# Patient Record
Sex: MALE | Race: WHITE | NOT HISPANIC OR LATINO | URBAN - METROPOLITAN AREA
[De-identification: names, ages, dates, MRNs, and addresses within clinical notes are randomized per-mention and may not be internally consistent; named-entity substitution may affect disease eponyms.]

---

## 2023-10-03 ENCOUNTER — TELEPHONE (OUTPATIENT)
Dept: PEDIATRIC NEUROLOGY | Facility: HOSPITAL | Age: 33
End: 2023-10-03
Payer: MEDICAID

## 2023-10-03 DIAGNOSIS — F90.2 ATTENTION DEFICIT HYPERACTIVITY DISORDER (ADHD), COMBINED TYPE: ICD-10-CM

## 2023-10-03 NOTE — TELEPHONE ENCOUNTER
Rx Refill Request Telephone Encounter    Name:  Neptali Oviedo  :  [id number]  Medication Name:  FOCALIN (DEXMETHYLPHENIDATE HCl ER 40MG ORAL CAP EXTENDED RELEASE 24 HOUR 1 PER DAY            Specific Pharmacy location:  [facility]  Date of last appointment:    Date of next appointment:  -  Best number to reach patient:  N/A

## 2023-10-04 RX ORDER — DEXMETHYLPHENIDATE HYDROCHLORIDE 40 MG/1
40 CAPSULE, EXTENDED RELEASE ORAL DAILY
COMMUNITY
Start: 2022-11-01 | End: 2023-10-04 | Stop reason: SDUPTHER

## 2023-10-04 RX ORDER — DEXMETHYLPHENIDATE HYDROCHLORIDE 40 MG/1
40 CAPSULE, EXTENDED RELEASE ORAL DAILY
Qty: 30 CAPSULE | Refills: 0 | Status: SHIPPED | OUTPATIENT
Start: 2023-10-05 | End: 2023-10-06 | Stop reason: SDUPTHER

## 2023-10-04 RX ORDER — DEXMETHYLPHENIDATE HYDROCHLORIDE 40 MG/1
40 CAPSULE, EXTENDED RELEASE ORAL DAILY
Qty: 30 CAPSULE | Refills: 0 | Status: SHIPPED | OUTPATIENT
Start: 2023-12-05 | End: 2023-10-06 | Stop reason: SDUPTHER

## 2023-10-04 RX ORDER — DEXMETHYLPHENIDATE HYDROCHLORIDE 40 MG/1
40 CAPSULE, EXTENDED RELEASE ORAL DAILY
Qty: 30 CAPSULE | Refills: 0 | Status: SHIPPED | OUTPATIENT
Start: 2023-10-04 | End: 2023-10-06 | Stop reason: SDUPTHER

## 2023-10-06 DIAGNOSIS — F90.2 ATTENTION DEFICIT HYPERACTIVITY DISORDER (ADHD), COMBINED TYPE: ICD-10-CM

## 2023-10-06 RX ORDER — DEXMETHYLPHENIDATE HYDROCHLORIDE 40 MG/1
40 CAPSULE, EXTENDED RELEASE ORAL DAILY
Qty: 30 CAPSULE | Refills: 0 | Status: SHIPPED | OUTPATIENT
Start: 2023-11-05 | End: 2023-12-27 | Stop reason: SDUPTHER

## 2023-10-06 RX ORDER — DEXMETHYLPHENIDATE HYDROCHLORIDE 40 MG/1
40 CAPSULE, EXTENDED RELEASE ORAL DAILY
Qty: 30 CAPSULE | Refills: 0 | Status: SHIPPED | OUTPATIENT
Start: 2023-10-06 | End: 2023-11-05

## 2023-10-06 RX ORDER — DEXMETHYLPHENIDATE HYDROCHLORIDE 40 MG/1
40 CAPSULE, EXTENDED RELEASE ORAL DAILY
Qty: 30 CAPSULE | Refills: 0 | Status: SHIPPED | OUTPATIENT
Start: 2023-12-06 | End: 2024-02-21 | Stop reason: SDUPTHER

## 2023-11-24 ENCOUNTER — OFFICE VISIT (OUTPATIENT)
Dept: PEDIATRIC NEUROLOGY | Facility: CLINIC | Age: 33
End: 2023-11-24
Payer: MEDICAID

## 2023-11-24 VITALS — HEIGHT: 71 IN | BODY MASS INDEX: 29.26 KG/M2 | WEIGHT: 209 LBS

## 2023-11-24 DIAGNOSIS — G40.909 NONINTRACTABLE EPILEPSY WITHOUT STATUS EPILEPTICUS, UNSPECIFIED EPILEPSY TYPE (MULTI): ICD-10-CM

## 2023-11-24 DIAGNOSIS — F90.9 ATTENTION DEFICIT HYPERACTIVITY DISORDER (ADHD), UNSPECIFIED ADHD TYPE: ICD-10-CM

## 2023-11-24 DIAGNOSIS — F41.9 ANXIETY DISORDER, UNSPECIFIED TYPE: ICD-10-CM

## 2023-11-24 DIAGNOSIS — G47.00 ORGANIC DISORDERS OF INITIATING AND MAINTAINING SLEEP: ICD-10-CM

## 2023-11-24 DIAGNOSIS — F79 INTELLECTUAL DISABILITY: ICD-10-CM

## 2023-11-24 DIAGNOSIS — R46.89 AGGRESSIVE BEHAVIOR: Primary | ICD-10-CM

## 2023-11-24 PROBLEM — F34.81 DISRUPTIVE MOOD DYSREGULATION DISORDER (MULTI): Status: ACTIVE | Noted: 2023-11-24

## 2023-11-24 PROBLEM — K02.9 CARIES: Status: ACTIVE | Noted: 2021-06-09

## 2023-11-24 PROBLEM — E78.2 HYPERLIPIDEMIA, MIXED: Status: ACTIVE | Noted: 2022-01-07

## 2023-11-24 PROCEDURE — 1036F TOBACCO NON-USER: CPT | Performed by: PSYCHIATRY & NEUROLOGY

## 2023-11-24 PROCEDURE — 99213 OFFICE O/P EST LOW 20 MIN: CPT | Performed by: PSYCHIATRY & NEUROLOGY

## 2023-11-24 RX ORDER — TOPIRAMATE 50 MG/1
TABLET, FILM COATED ORAL
COMMUNITY
Start: 2023-11-22

## 2023-11-24 RX ORDER — CHLORPROMAZINE HYDROCHLORIDE 50 MG/1
50 TABLET, FILM COATED ORAL 3 TIMES DAILY
COMMUNITY
Start: 2022-01-07 | End: 2024-04-05 | Stop reason: SDUPTHER

## 2023-11-24 RX ORDER — HYDROXYZINE HYDROCHLORIDE 25 MG/1
TABLET, FILM COATED ORAL
COMMUNITY
Start: 2019-07-31 | End: 2024-02-08

## 2023-11-24 RX ORDER — LORATADINE 10 MG/1
10 TABLET ORAL
COMMUNITY
Start: 2020-10-30

## 2023-11-24 RX ORDER — HYDROXYZINE HYDROCHLORIDE 50 MG/1
TABLET, FILM COATED ORAL
COMMUNITY
Start: 2023-11-22 | End: 2024-02-08

## 2023-11-24 RX ORDER — ZIPRASIDONE HYDROCHLORIDE 40 MG/1
CAPSULE ORAL
COMMUNITY
Start: 2023-11-22 | End: 2024-02-08

## 2023-11-24 RX ORDER — ZIPRASIDONE HYDROCHLORIDE 20 MG/1
20 CAPSULE ORAL
COMMUNITY
Start: 2022-07-08 | End: 2024-02-08

## 2023-11-24 RX ORDER — TOPIRAMATE 25 MG/1
75 TABLET ORAL 2 TIMES DAILY
COMMUNITY
Start: 2015-04-27

## 2023-11-24 RX ORDER — FENOFIBRATE 145 MG/1
145 TABLET, FILM COATED ORAL
COMMUNITY
Start: 2023-07-06

## 2023-11-24 RX ORDER — DIVALPROEX SODIUM 125 MG/1
3 CAPSULE, COATED PELLETS ORAL 3 TIMES DAILY
COMMUNITY
Start: 2016-01-22 | End: 2024-02-08

## 2023-11-24 RX ORDER — DULOXETIN HYDROCHLORIDE 60 MG/1
CAPSULE, DELAYED RELEASE ORAL
COMMUNITY
Start: 2023-11-22 | End: 2024-02-08
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
## 2023-11-24 NOTE — PATIENT INSTRUCTIONS
Neptali is seizure free with no medication side effects. Aggression and anxiety are reduced and manageable.     1. No change in his seizure medications.  2. No change in behavior medications.  3. When feasible, check seizure medication levels, lipids, CMP and CBC.  4. Follow up in 6 months.

## 2023-12-26 DIAGNOSIS — F90.2 ATTENTION DEFICIT HYPERACTIVITY DISORDER (ADHD), COMBINED TYPE: ICD-10-CM

## 2023-12-27 DIAGNOSIS — F90.2 ATTENTION DEFICIT HYPERACTIVITY DISORDER (ADHD), COMBINED TYPE: ICD-10-CM

## 2023-12-27 RX ORDER — DEXMETHYLPHENIDATE HYDROCHLORIDE 40 MG/1
40 CAPSULE, EXTENDED RELEASE ORAL DAILY
Qty: 30 CAPSULE | Refills: 0 | Status: SHIPPED | OUTPATIENT
Start: 2024-02-27 | End: 2024-03-28

## 2023-12-27 RX ORDER — DEXMETHYLPHENIDATE HYDROCHLORIDE 40 MG/1
40 CAPSULE, EXTENDED RELEASE ORAL DAILY
Qty: 30 CAPSULE | Refills: 0 | Status: SHIPPED | OUTPATIENT
Start: 2024-01-27 | End: 2024-02-26

## 2023-12-27 RX ORDER — DEXMETHYLPHENIDATE HYDROCHLORIDE 40 MG/1
40 CAPSULE, EXTENDED RELEASE ORAL DAILY
Qty: 30 CAPSULE | Refills: 0 | Status: SHIPPED | OUTPATIENT
Start: 2023-12-27

## 2024-01-12 RX ORDER — DEXMETHYLPHENIDATE HYDROCHLORIDE 40 MG/1
CAPSULE, EXTENDED RELEASE ORAL
Qty: 30 CAPSULE | Refills: 0 | OUTPATIENT
Start: 2024-01-12

## 2024-02-07 DIAGNOSIS — R46.89 AGGRESSIVE BEHAVIOR: ICD-10-CM

## 2024-02-07 DIAGNOSIS — G40.909 NONINTRACTABLE EPILEPSY WITHOUT STATUS EPILEPTICUS, UNSPECIFIED EPILEPSY TYPE (MULTI): Primary | ICD-10-CM

## 2024-02-07 DIAGNOSIS — F41.9 ANXIETY DISORDER, UNSPECIFIED TYPE: ICD-10-CM

## 2024-02-08 RX ORDER — ZIPRASIDONE HYDROCHLORIDE 40 MG/1
CAPSULE ORAL
Qty: 62 CAPSULE | Refills: 10 | Status: SHIPPED | OUTPATIENT
Start: 2024-02-08

## 2024-02-08 RX ORDER — DIVALPROEX SODIUM 125 MG/1
CAPSULE, COATED PELLETS ORAL
Qty: 279 CAPSULE | Refills: 10 | Status: SHIPPED | OUTPATIENT
Start: 2024-02-08

## 2024-02-08 RX ORDER — DULOXETIN HYDROCHLORIDE 20 MG/1
CAPSULE, DELAYED RELEASE ORAL
Qty: 31 CAPSULE | Refills: 10 | Status: SHIPPED | OUTPATIENT
Start: 2024-02-08

## 2024-02-08 RX ORDER — ZIPRASIDONE HYDROCHLORIDE 20 MG/1
CAPSULE ORAL
Qty: 31 CAPSULE | Refills: 10 | Status: SHIPPED | OUTPATIENT
Start: 2024-02-08

## 2024-02-08 RX ORDER — HYDROXYZINE HYDROCHLORIDE 50 MG/1
TABLET, FILM COATED ORAL
Qty: 31 TABLET | Refills: 10 | Status: SHIPPED | OUTPATIENT
Start: 2024-02-08 | End: 2024-03-25 | Stop reason: SDUPTHER

## 2024-02-08 RX ORDER — HYDROXYZINE HYDROCHLORIDE 25 MG/1
TABLET, FILM COATED ORAL
Qty: 31 TABLET | Refills: 10 | Status: SHIPPED | OUTPATIENT
Start: 2024-02-08 | End: 2024-03-25 | Stop reason: DRUGHIGH

## 2024-02-08 RX ORDER — DULOXETIN HYDROCHLORIDE 60 MG/1
CAPSULE, DELAYED RELEASE ORAL
Qty: 31 CAPSULE | Refills: 10 | Status: SHIPPED | OUTPATIENT
Start: 2024-02-08

## 2024-02-13 ENCOUNTER — TELEPHONE (OUTPATIENT)
Dept: PEDIATRIC NEUROLOGY | Facility: HOSPITAL | Age: 34
End: 2024-02-13
Payer: MEDICAID

## 2024-02-21 DIAGNOSIS — F90.2 ATTENTION DEFICIT HYPERACTIVITY DISORDER (ADHD), COMBINED TYPE: ICD-10-CM

## 2024-02-21 RX ORDER — DEXMETHYLPHENIDATE HYDROCHLORIDE 40 MG/1
40 CAPSULE, EXTENDED RELEASE ORAL DAILY
Qty: 30 CAPSULE | Refills: 0 | Status: SHIPPED | OUTPATIENT
Start: 2024-02-21 | End: 2024-04-11 | Stop reason: SDUPTHER

## 2024-02-21 RX ORDER — DEXMETHYLPHENIDATE HYDROCHLORIDE 40 MG/1
40 CAPSULE, EXTENDED RELEASE ORAL DAILY
Qty: 30 CAPSULE | Refills: 0 | Status: SHIPPED | OUTPATIENT
Start: 2024-03-22 | End: 2024-04-21

## 2024-03-22 ENCOUNTER — TELEPHONE (OUTPATIENT)
Dept: PEDIATRIC NEUROLOGY | Facility: HOSPITAL | Age: 34
End: 2024-03-22
Payer: MEDICAID

## 2024-03-22 NOTE — TELEPHONE ENCOUNTER
Called and spoke with mom. Neptali has been doing pretty good overall. Has a new provider in his house and at the day program. Everyone is observing an increase in anxiousness and agitation after about 4pm. The only medication Neptali gets around that time is his hydroxyzine 25mg dose. They are requesting an increase in this dose to help evening time behaviors. Neptali's mood seems down in the evenings, is more on edge after this time and he is having a hard time settling. He sits down to eat dinner but only takes a few bites and gets back up. This lasts until he goes to bed. Mom thinks a contributing factor also is that there is more free time in the evenings and less structure, as Neptali prefers structure.    Neptali has a bedtime routine that starts around 9:30pm and he is in bed by 10pm. He does ask to go to bed sometimes and seems tired but often just lays there until about 12:30am. This has been going on 6-8 months per mom, so it is not new, but is an ongoing issue.    Mom noting that they have seen lots of positive changes with the new staff. Neptali was struggling with regressive behaviors, such as bed wetting, are now completely gone. He has been able to do more social things now, like going grocery shopping.     If any med changes are made, pharmacy was changed to [facility]..

## 2024-03-22 NOTE — TELEPHONE ENCOUNTER
95kg  Focalin ER 40mg daily  Duloxetine 80mg daily  Hydroxyzine 50mgAM, 25mg HS  Ziprasidone 40mg/40mg/20mg  Thorazine 50mg BID  Depakote sprinkles (3 caps)375mg TID  Topiramate 75mg BID

## 2024-03-25 DIAGNOSIS — F41.9 ANXIETY DISORDER, UNSPECIFIED TYPE: ICD-10-CM

## 2024-03-25 RX ORDER — HYDROXYZINE HYDROCHLORIDE 50 MG/1
TABLET, FILM COATED ORAL
Qty: 31 TABLET | Refills: 1 | Status: SHIPPED | OUTPATIENT
Start: 2024-03-25 | End: 2024-04-01 | Stop reason: SDUPTHER

## 2024-03-25 NOTE — TELEPHONE ENCOUNTER
"Called and spoke with mom. Discussed with her that Dr Grant is okay with trialing an increase in Neptali's 4pm hydroxyzine to 50mg. They are to trial this increase and mom is to call with an update in about 3 weeks. Mom states a new script needs to be sent for this to [facility] on file. She asks that it states instead of \"50mg twice daily\" that it specifies morning and 4pm on the script. Mom verbalized understanding of the plan and states no further questions at this time.  "

## 2024-04-01 ENCOUNTER — TELEPHONE (OUTPATIENT)
Dept: PEDIATRIC NEUROLOGY | Facility: HOSPITAL | Age: 34
End: 2024-04-01
Payer: MEDICAID

## 2024-04-01 DIAGNOSIS — F41.9 ANXIETY DISORDER, UNSPECIFIED TYPE: ICD-10-CM

## 2024-04-01 RX ORDER — HYDROXYZINE HYDROCHLORIDE 50 MG/1
TABLET, FILM COATED ORAL
Qty: 62 TABLET | Refills: 5 | Status: SHIPPED | OUTPATIENT
Start: 2024-04-01

## 2024-04-05 DIAGNOSIS — R46.89 AGGRESSIVE BEHAVIOR: ICD-10-CM

## 2024-04-05 RX ORDER — CHLORPROMAZINE HYDROCHLORIDE 50 MG/1
50 TABLET, FILM COATED ORAL 2 TIMES DAILY
Qty: 60 TABLET | Refills: 3 | Status: SHIPPED | OUTPATIENT
Start: 2024-04-05 | End: 2024-08-03

## 2024-04-11 DIAGNOSIS — F90.2 ATTENTION DEFICIT HYPERACTIVITY DISORDER (ADHD), COMBINED TYPE: ICD-10-CM

## 2024-04-11 RX ORDER — DEXMETHYLPHENIDATE HYDROCHLORIDE 40 MG/1
40 CAPSULE, EXTENDED RELEASE ORAL DAILY
Qty: 30 CAPSULE | Refills: 0 | Status: SHIPPED | OUTPATIENT
Start: 2024-04-11 | End: 2024-05-11

## 2024-04-11 RX ORDER — DEXMETHYLPHENIDATE HYDROCHLORIDE 40 MG/1
40 CAPSULE, EXTENDED RELEASE ORAL DAILY
Qty: 30 CAPSULE | Refills: 0 | Status: SHIPPED | OUTPATIENT
Start: 2024-06-10 | End: 2024-07-10

## 2024-04-11 RX ORDER — DEXMETHYLPHENIDATE HYDROCHLORIDE 40 MG/1
40 CAPSULE, EXTENDED RELEASE ORAL DAILY
Qty: 30 CAPSULE | Refills: 0 | Status: SHIPPED | OUTPATIENT
Start: 2024-05-11 | End: 2024-06-10

## 2024-04-11 RX ORDER — DEXMETHYLPHENIDATE HYDROCHLORIDE 40 MG/1
CAPSULE, EXTENDED RELEASE ORAL
Qty: 28 CAPSULE | OUTPATIENT
Start: 2024-04-11

## 2024-04-17 ENCOUNTER — TELEPHONE (OUTPATIENT)
Dept: PEDIATRIC NEUROLOGY | Facility: HOSPITAL | Age: 34
End: 2024-04-17
Payer: MEDICAID

## 2024-04-17 NOTE — TELEPHONE ENCOUNTER
Neptali has 2 RNs working with him at home now. They were offering to take him to get his blood work done, the PCP has some labs they want drawn. Mom was wondering if they could get a Depakote level checked as well as any other labs you feel necessary.  Hasn't had labs in what look s like 3 years.      Mom also states with the hydroxyzine there has been not much of a change, but no negative change so she wants to leave the dose where it is now. She wants levels firs then we can go from there.  They have appt with dr. Grant in 5 weeks.

## 2024-04-22 ENCOUNTER — DOCUMENTATION (OUTPATIENT)
Dept: PEDIATRIC NEUROLOGY | Facility: CLINIC | Age: 34
End: 2024-04-22
Payer: MEDICAID

## 2024-04-22 DIAGNOSIS — G40.909 NONINTRACTABLE EPILEPSY WITHOUT STATUS EPILEPTICUS, UNSPECIFIED EPILEPSY TYPE (MULTI): ICD-10-CM

## 2024-04-22 DIAGNOSIS — R46.89 AGGRESSIVE BEHAVIOR: ICD-10-CM

## 2024-04-22 NOTE — PROGRESS NOTES
Denial received for dexmethylphenidate 40mg ER cap. 1 cap daily  Verbal PA submitted for dexmethyl ER 40mg caps Ref: [id number]

## 2024-04-30 ENCOUNTER — TELEPHONE (OUTPATIENT)
Dept: PEDIATRIC NEUROLOGY | Facility: CLINIC | Age: 34
End: 2024-04-30
Payer: MEDICAID

## 2024-04-30 DIAGNOSIS — R46.89 AGGRESSIVE BEHAVIOR: ICD-10-CM

## 2024-04-30 NOTE — TELEPHONE ENCOUNTER
Mom also requesting note to give depakote whole in applesauce, yogurt or pudding.    Letter sent to [facility] to place in dr. fernandes's box to sign
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
## 2024-05-03 DIAGNOSIS — R46.89 AGGRESSIVE BEHAVIOR: ICD-10-CM

## 2024-05-03 RX ORDER — CHLORPROMAZINE HYDROCHLORIDE 25 MG/1
25 TABLET, FILM COATED ORAL 2 TIMES DAILY
Qty: 60 TABLET | Refills: 0 | Status: SHIPPED | OUTPATIENT
Start: 2024-05-03 | End: 2024-05-07 | Stop reason: SDUPTHER

## 2024-05-03 RX ORDER — CHLORPROMAZINE HYDROCHLORIDE 25 MG/1
25 TABLET, FILM COATED ORAL 2 TIMES DAILY
Qty: 60 TABLET | Refills: 0 | Status: CANCELLED | OUTPATIENT
Start: 2024-05-03 | End: 2024-06-02

## 2024-05-07 ENCOUNTER — LAB (OUTPATIENT)
Dept: LAB | Facility: LAB | Age: 34
End: 2024-05-07
Payer: COMMERCIAL

## 2024-05-07 DIAGNOSIS — R46.89 AGGRESSIVE BEHAVIOR: ICD-10-CM

## 2024-05-07 DIAGNOSIS — G40.909 NONINTRACTABLE EPILEPSY WITHOUT STATUS EPILEPTICUS, UNSPECIFIED EPILEPSY TYPE (MULTI): ICD-10-CM

## 2024-05-07 RX ORDER — CHLORPROMAZINE HYDROCHLORIDE 25 MG/1
25 TABLET, FILM COATED ORAL 2 TIMES DAILY
Qty: 60 TABLET | Refills: 0 | Status: SHIPPED | OUTPATIENT
Start: 2024-05-07 | End: 2024-06-06

## 2024-05-08 ENCOUNTER — APPOINTMENT (OUTPATIENT)
Dept: PEDIATRIC NEUROLOGY | Facility: CLINIC | Age: 34
End: 2024-05-08
Payer: COMMERCIAL

## 2024-05-24 ENCOUNTER — OFFICE VISIT (OUTPATIENT)
Dept: PEDIATRIC NEUROLOGY | Facility: CLINIC | Age: 34
End: 2024-05-24
Payer: COMMERCIAL

## 2024-05-24 VITALS
HEIGHT: 72 IN | SYSTOLIC BLOOD PRESSURE: 128 MMHG | BODY MASS INDEX: 28.37 KG/M2 | WEIGHT: 209.44 LBS | HEART RATE: 95 BPM | DIASTOLIC BLOOD PRESSURE: 85 MMHG

## 2024-05-24 DIAGNOSIS — F90.9 ATTENTION DEFICIT HYPERACTIVITY DISORDER (ADHD), UNSPECIFIED ADHD TYPE: ICD-10-CM

## 2024-05-24 DIAGNOSIS — F90.9 ATTENTION DEFICIT HYPERACTIVITY DISORDER (ADHD), UNSPECIFIED ADHD TYPE: Primary | ICD-10-CM

## 2024-05-24 DIAGNOSIS — R46.89 AGGRESSIVE BEHAVIOR: ICD-10-CM

## 2024-05-24 DIAGNOSIS — F41.9 ANXIETY DISORDER, UNSPECIFIED TYPE: ICD-10-CM

## 2024-05-24 PROCEDURE — 99214 OFFICE O/P EST MOD 30 MIN: CPT | Performed by: PSYCHIATRY & NEUROLOGY

## 2024-05-24 PROCEDURE — 1036F TOBACCO NON-USER: CPT | Performed by: PSYCHIATRY & NEUROLOGY

## 2024-05-24 RX ORDER — LORAZEPAM 1 MG/1
1 TABLET ORAL AS NEEDED
Qty: 5 TABLET | Refills: 0 | Status: SHIPPED | OUTPATIENT
Start: 2024-05-24

## 2024-05-24 RX ORDER — DEXMETHYLPHENIDATE HYDROCHLORIDE 10 MG/1
TABLET ORAL
Qty: 35 TABLET | Refills: 0 | Status: SHIPPED | OUTPATIENT
Start: 2024-05-24 | End: 2024-05-31 | Stop reason: SDUPTHER

## 2024-05-24 RX ORDER — DEXMETHYLPHENIDATE HYDROCHLORIDE 10 MG/1
TABLET ORAL
Qty: 35 TABLET | Refills: 0 | Status: SHIPPED | OUTPATIENT
Start: 2024-05-24 | End: 2024-05-24 | Stop reason: SDUPTHER
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
## 2024-05-24 NOTE — PATIENT INSTRUCTIONS
Neptali has upsets with transitions.  In the afternoon, he is more active and fidgety.  Anxiety and OCD may be reduced with his medication increases but the upsets with transitions continue.  Since there is a behavioral difference between morning and afternoon, the reason may be ADHD based.  Therefore we are doing the following:     Change dexmethylphenidate to the tablets at a dose of 20 mg in the morning and 11-12 and 10 mg at 3-4 PM.  See how this affects upsets and impulsivity.  No change in other medications at this time.  If needed, may further increase duloxetine.  Call next week about the dexmethylphenidate effect.  Lorazepam 1 mg tab to keep him calm before oral surgery.  Try 1 tablet before to note effect.  If not enough, then give 2 tabs on day of surgery.  Follow up in 3 months.
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
## 2024-05-31 ENCOUNTER — TELEPHONE (OUTPATIENT)
Dept: PEDIATRIC NEUROLOGY | Facility: CLINIC | Age: 34
End: 2024-05-31
Payer: COMMERCIAL

## 2024-05-31 RX ORDER — DEXMETHYLPHENIDATE HYDROCHLORIDE 10 MG/1
TABLET ORAL
Qty: 150 TABLET | Refills: 0 | Status: SHIPPED | OUTPATIENT
Start: 2024-05-31

## 2024-05-31 NOTE — TELEPHONE ENCOUNTER
Per 5/24 visit note, changed Focalin XR 40mg to Focalin IR dosing at 20mg AM, 20mg noon, 10mg 4pm. See how this affects upsets and impulsivity.

## 2024-05-31 NOTE — TELEPHONE ENCOUNTER
Mom calling to state Dr Grant sent a 7 day script for a new med for Neptali at the last appointment and they would like to continue this for now. Mom is requesting a 30 day script be sent to [facility] in [place].    [phone]

## 2024-05-31 NOTE — TELEPHONE ENCOUNTER
Called and spoke with mom. Things seem to be going a little better with Neptali on the IR dosing of Focalin. The transition home was smoother, not as aggressive. Mom would like to continue this dosing over the next 30 days and then will report back again. Script is to go to [facility] pharmacy on file. Mom with no further questions at this time.

## 2024-06-17 DIAGNOSIS — F90.9 ATTENTION DEFICIT HYPERACTIVITY DISORDER (ADHD), UNSPECIFIED ADHD TYPE: ICD-10-CM

## 2024-06-17 RX ORDER — DEXMETHYLPHENIDATE HYDROCHLORIDE 10 MG/1
TABLET ORAL
Qty: 150 TABLET | Refills: 0 | Status: SHIPPED | OUTPATIENT
Start: 2024-06-26

## 2024-06-21 ENCOUNTER — TELEPHONE (OUTPATIENT)
Dept: PEDIATRIC NEUROLOGY | Facility: CLINIC | Age: 34
End: 2024-06-21
Payer: COMMERCIAL

## 2024-06-21 DIAGNOSIS — R46.89 AGGRESSIVE BEHAVIOR: ICD-10-CM

## 2024-06-21 RX ORDER — CHLORPROMAZINE HYDROCHLORIDE 25 MG/1
25 TABLET, FILM COATED ORAL 2 TIMES DAILY
Qty: 60 TABLET | Refills: 5 | Status: SHIPPED | OUTPATIENT
Start: 2024-06-21 | End: 2024-12-18

## 2024-06-21 RX ORDER — CHLORPROMAZINE HYDROCHLORIDE 50 MG/1
50 TABLET, FILM COATED ORAL 2 TIMES DAILY
Qty: 60 TABLET | Refills: 5 | Status: SHIPPED | OUTPATIENT
Start: 2024-06-21 | End: 2024-12-18

## 2024-06-21 NOTE — TELEPHONE ENCOUNTER
Received call from [facility] pharmacy with questions about chlorpromazine dosing/scripts.    [phone]

## 2024-06-21 NOTE — TELEPHONE ENCOUNTER
Called and spoke with [facility] pharmacist regarding scripts. Questions on chlorpromazine and focalin. Verbal order given for chlorpromazine 25mg and 50mg tablets (1 month supply) due to them being completely out of medication and they are not open over the weekend. RN will also send further refills on these scripts for Dr Grant's signature. RN also clarifying that Neptali is no longer on Focalin XR and is currently on the Focalin IR tablets. Pharmacist removing all Focalin XR scripts from their system to reduce any further confusion on this. Pharmacist with no further questions at this time.

## 2024-06-24 DIAGNOSIS — R46.89 AGGRESSIVE BEHAVIOR: ICD-10-CM

## 2024-06-24 DIAGNOSIS — G40.909 NONINTRACTABLE EPILEPSY WITHOUT STATUS EPILEPTICUS, UNSPECIFIED EPILEPSY TYPE (MULTI): ICD-10-CM

## 2024-06-24 RX ORDER — DIVALPROEX SODIUM 125 MG/1
CAPSULE, COATED PELLETS ORAL
Qty: 279 CAPSULE | Refills: 10 | Status: SHIPPED | OUTPATIENT
Start: 2024-06-24

## 2024-06-24 RX ORDER — ZIPRASIDONE HYDROCHLORIDE 40 MG/1
CAPSULE ORAL
Qty: 62 CAPSULE | Refills: 10 | Status: SHIPPED | OUTPATIENT
Start: 2024-06-24

## 2024-06-24 RX ORDER — ZIPRASIDONE HYDROCHLORIDE 20 MG/1
CAPSULE ORAL
Qty: 31 CAPSULE | Refills: 10 | Status: SHIPPED | OUTPATIENT
Start: 2024-06-24

## 2024-06-26 ENCOUNTER — TELEPHONE (OUTPATIENT)
Dept: PEDIATRIC NEUROLOGY | Facility: CLINIC | Age: 34
End: 2024-06-26
Payer: COMMERCIAL

## 2024-06-26 NOTE — TELEPHONE ENCOUNTER
Called and spoke with [facility] representative. Medication was denied on 6/10 but sent to incorrect fax number. Denied due to quantity limit. Plan pays for 2 tablets/day, script is for 5 tablets/day.     Representative sending original denial letter to fax RN provided as well as an appeals form to be completed.

## 2024-06-26 NOTE — TELEPHONE ENCOUNTER
Received letter stating a PA for dexmethylphenidate 10mg tablets was canceled due to a denial already on file for the medication.     No denial scanned to patient's chart.

## 2024-06-27 NOTE — TELEPHONE ENCOUNTER
Mom called for update as she will run out of medication after Saturday and she is not sure what the hold up in getting medication is.   Called and left VM as mom did not answer and not an identified VM.

## 2024-06-28 NOTE — TELEPHONE ENCOUNTER
Called and spoke with mom. Reviewed with her that insurance has denied coverage for Neptali's Focalin 10mg tablets at this time. An appeal has been submitted but we are waiting on the response from insurance. Mom states she has already paid for a 30 day script with [employer] in the meantime so Neptali did not have to go without medication. Mom states if she has not heard back on whether or not the appeal was overturned, she will call back in 1-2 weeks. Let her know, if Focalin tablets are not approved, Dr Grant did state we can trial Neptali on Azstarys as a back-up plan. Mom verbalized understanding and states no further questions at this time.

## 2024-06-28 NOTE — TELEPHONE ENCOUNTER
Received fax from [employer] in regards to the appeal they received for Focalin 10mg tablets, requesting additional information by end of business today. RN answered all questions and faxed back to [phone] as requested.    Received confirmed success on fax sent.

## 2024-06-28 NOTE — TELEPHONE ENCOUNTER
Called and spoke with [employer] again as no fax had been received with denial letter/appeal form. RN able to submit verbal appeal for dexmethylphenidate 10mg tablets.    Reference #: [id number]    Representative requested RN fax office note/supporting documentation via fax to attach to verbal appeal. Fax: [phone]

## 2024-06-28 NOTE — TELEPHONE ENCOUNTER
Still have not received denial letter or appeal form from [employer]. Called and spoke with representative again. They will send it again within the next hour. Provided them with alternate fax number to be sent to this morning.

## 2024-06-28 NOTE — TELEPHONE ENCOUNTER
Received original denial letter. Sent to  Amelie to scan to patient's chart.    Received success confirmation on visit note faxed to [employer].

## 2024-07-02 DIAGNOSIS — F90.9 ATTENTION DEFICIT HYPERACTIVITY DISORDER (ADHD), UNSPECIFIED ADHD TYPE: ICD-10-CM

## 2024-07-02 RX ORDER — DEXMETHYLPHENIDATE HYDROCHLORIDE 10 MG/1
TABLET ORAL
Qty: 105 TABLET | Refills: 0 | Status: SHIPPED | OUTPATIENT
Start: 2024-08-30

## 2024-07-02 RX ORDER — DEXMETHYLPHENIDATE HYDROCHLORIDE 10 MG/1
TABLET ORAL
Qty: 105 TABLET | Refills: 0 | Status: SHIPPED | OUTPATIENT
Start: 2024-07-02

## 2024-07-02 RX ORDER — DEXMETHYLPHENIDATE HYDROCHLORIDE 10 MG/1
TABLET ORAL
Qty: 105 TABLET | Refills: 0 | Status: SHIPPED | OUTPATIENT
Start: 2024-08-01

## 2024-07-25 DIAGNOSIS — F90.9 ATTENTION DEFICIT HYPERACTIVITY DISORDER (ADHD), UNSPECIFIED ADHD TYPE: ICD-10-CM

## 2024-07-25 RX ORDER — DEXMETHYLPHENIDATE HYDROCHLORIDE 10 MG/1
TABLET ORAL
Qty: 150 TABLET | Refills: 0 | Status: SHIPPED | OUTPATIENT
Start: 2024-08-01 | End: 2024-08-01 | Stop reason: SDUPTHER

## 2024-07-31 DIAGNOSIS — F90.9 ATTENTION DEFICIT HYPERACTIVITY DISORDER (ADHD), UNSPECIFIED ADHD TYPE: ICD-10-CM

## 2024-07-31 RX ORDER — TOPIRAMATE 50 MG/1
50 TABLET, FILM COATED ORAL 2 TIMES DAILY
Qty: 180 TABLET | Refills: 1 | Status: SHIPPED | OUTPATIENT
Start: 2024-07-31 | End: 2025-01-27

## 2024-07-31 RX ORDER — TOPIRAMATE 25 MG/1
25 TABLET ORAL 2 TIMES DAILY
Qty: 180 TABLET | Refills: 1 | Status: SHIPPED | OUTPATIENT
Start: 2024-07-31 | End: 2025-01-27

## 2024-07-31 RX ORDER — FENOFIBRATE 145 MG/1
145 TABLET, FILM COATED ORAL
Qty: 90 TABLET | Refills: 1 | Status: SHIPPED | OUTPATIENT
Start: 2024-07-31 | End: 2025-01-27

## 2024-08-01 ENCOUNTER — APPOINTMENT (OUTPATIENT)
Dept: PEDIATRIC NEUROLOGY | Facility: CLINIC | Age: 34
End: 2024-08-01
Payer: COMMERCIAL

## 2024-08-01 VITALS — WEIGHT: 198.41 LBS | HEIGHT: 70 IN | BODY MASS INDEX: 28.41 KG/M2

## 2024-08-01 DIAGNOSIS — G40.909 NONINTRACTABLE EPILEPSY WITHOUT STATUS EPILEPTICUS, UNSPECIFIED EPILEPSY TYPE (MULTI): ICD-10-CM

## 2024-08-01 DIAGNOSIS — R46.89 AGGRESSIVE BEHAVIOR: ICD-10-CM

## 2024-08-01 DIAGNOSIS — F79 INTELLECTUAL DISABILITY: ICD-10-CM

## 2024-08-01 DIAGNOSIS — F90.9 ATTENTION DEFICIT HYPERACTIVITY DISORDER (ADHD), UNSPECIFIED ADHD TYPE: ICD-10-CM

## 2024-08-01 DIAGNOSIS — F41.9 ANXIETY DISORDER, UNSPECIFIED TYPE: Primary | ICD-10-CM

## 2024-08-01 DIAGNOSIS — G47.00 ORGANIC DISORDERS OF INITIATING AND MAINTAINING SLEEP: ICD-10-CM

## 2024-08-01 PROCEDURE — 99213 OFFICE O/P EST LOW 20 MIN: CPT | Performed by: PSYCHIATRY & NEUROLOGY

## 2024-08-01 PROCEDURE — 3008F BODY MASS INDEX DOCD: CPT | Performed by: PSYCHIATRY & NEUROLOGY

## 2024-08-01 RX ORDER — DEXMETHYLPHENIDATE HYDROCHLORIDE 10 MG/1
TABLET ORAL
Qty: 150 TABLET | Refills: 0 | Status: SHIPPED | OUTPATIENT
Start: 2024-08-23

## 2024-08-01 RX ORDER — DEXMETHYLPHENIDATE HYDROCHLORIDE 10 MG/1
TABLET ORAL
Qty: 150 TABLET | Refills: 0 | Status: SHIPPED | OUTPATIENT
Start: 2024-10-21

## 2024-08-01 RX ORDER — DEXMETHYLPHENIDATE HYDROCHLORIDE 10 MG/1
TABLET ORAL
Qty: 150 TABLET | Refills: 0 | Status: SHIPPED | OUTPATIENT
Start: 2024-09-22

## 2024-08-01 NOTE — PATIENT INSTRUCTIONS
Neptali's upsets are reduced.  He has enuresis and encopresis that is location and, to some degree, time specific and not associated with any change in his neurologic exam, leading to the conclusion that it is behavioral in nature.  He goes to bed at 8 PM but does nto fall asleep till after 10 PM.    No change in medications.  Develop a plan to encourage him to use the bathroom at a fixed schedule so as to minimize any accidents.  Shift bedtime to 10 PM, which is more appropriate for someone his age.  If not enough, might change ziprasidone to a 40-20-40 mg schedule.  Follow up in 3 months.

## 2024-08-01 NOTE — LETTER
August 1, 2024     Mona Ingram DO  [address] [facility] [address]    Patient: Neptali Oviedo   YOB: 1990   Date of Visit: 8/1/2024       Dear Dr. Mona Ingram DO:    Thank you for referring Neptali Oviedo to me for evaluation. Below are my notes for this consultation.  If you have questions, please do not hesitate to call me. I look forward to following your patient along with you.       Sincerely,     Steve Grant MD      CC: No Recipients  ______________________________________________________________________________________    Subjective  Neptali Oviedo is a 34 y.o.   man with epilepsy    HPI    Neptali is a 33-year-old young man with developmental problems, epilepsy and challenging behaviors. He takes topiramate 75 mg twice a day and Depakote sprinkles 3 tid with no seizures or medication side effects.     Neptali takes dexmethylphenidate 20-20-10 mg (less active), duloxetine 80 mg qd, hydroxyzine 50 mg qAM and 25 mg qHS, ziprasidone 40-40-20 mg, and Thorazine 75 mg bid (tolerated dose decrease from 650 mg daily with some worsening on this dose). Methylphenidate and Abilify were stopped. A fluoxetine trial resulted in worse behavior so was stopped. Abilify reduced aggression. Increasing venlafaxine led to a call from his residence that he was more aggressive so the dose was decreased to 75 mg daily.. Venlafaxine was replaced with duloxetine, the present dose being 80 mg daily. This medication reduced the number of upsets to a few times weekly.  A further increase to 80 mg daily and of Thorazine to 75 mg bid had no effect on the upsets that occur with transitions, which include property destruction.  Upsets improved with increasing dexmethylphenidate to 20-20-10 mg daily using the IR formulation.     He attends a day program through [facility] 5 days weekly with a 1/2 day schedule. He has one on one supervision. He left previous program due to physical aggression towards staff and  inappropriate actions such as pulling down his pants or running out of the building that have led to a 30 notice of discharge. He goes to sports activities with his home aide.     Other behaviors include punching out neighbor's windows, attacking strangers and parents, punching father's truck, agitation in mother's car, throw items, yell. He asks that doors and cabinets be closed, appliances must be put away. He is bothered by the music in stores but not from the radio in the car or at home, crowds. He goes on trips but usually does not enter the locations.  This is reduced, perhaps due to increasing duloxetine.     Neptali was admitted to [facility] psychiatric unit in 2020 due to aggression. Methylphenidate, propranolol, and Abilify were stopped and chlorpromazine and venlafaxine were added for aggression and anxiety. He retuned to his home. Appetite is increased.     Past medications include methylphenidate, propranolol, Abilify, clonidine, risperidone, quetiapine, fluoxetine (worse).      ROS: Neptali developed nocturnal enuresis in February 2024 with a normal abdominal CT scan.  This reduced in frequency with a staff change.  It increased in conjunction with daytime enuresis and encopresis in June 2024 at his residential placement.  This rarely occurs at his day program or parent's home.  Reviewing a log finds it happens less often on weekends and usually happens in the evening and overnight.    Objective  Neurological Exam  Mental Status  Awake and alert.  Stands near me and says he wants to go  No aggression.    Motor   No abnormal involuntary movements.    Physical Exam  Constitutional:       General: He is awake.   Pulmonary:      Effort: Pulmonary effort is normal.   Abdominal:      Palpations: Abdomen is soft.   Neurological:      Mental Status: He is alert.       Assessment/Plan    Neptali's upsets are reduced.  He has enuresis and encopresis that is location and, to some degree, time specific and not associated  with any change in his neurologic exam, leading to the conclusion that it is behavioral in nature.  He goes to bed at 8 PM but does nto fall asleep till after 10 PM.    No change in medications.  Develop a plan to encourage him to use the bathroom at a fixed schedule so as to minimize any accidents.  Shift bedtime to 10 PM, which is more appropriate for someone his age.  If not enough, might change ziprasidone to a 40-20-40 mg schedule.  Follow up in 3 months.

## 2024-08-01 NOTE — PROGRESS NOTES
Subjective   Neptali Oviedo is a 34 y.o.   man with epilepsy    HPI    Neptali is a 33-year-old young man with developmental problems, epilepsy and challenging behaviors. He takes topiramate 75 mg twice a day and Depakote sprinkles 3 tid with no seizures or medication side effects.     Neptali takes dexmethylphenidate 20-20-10 mg (less active), duloxetine 80 mg qd, hydroxyzine 50 mg qAM and 25 mg qHS, ziprasidone 40-40-20 mg, and Thorazine 75 mg bid (tolerated dose decrease from 650 mg daily with some worsening on this dose). Methylphenidate and Abilify were stopped. A fluoxetine trial resulted in worse behavior so was stopped. Abilify reduced aggression. Increasing venlafaxine led to a call from his residence that he was more aggressive so the dose was decreased to 75 mg daily.. Venlafaxine was replaced with duloxetine, the present dose being 80 mg daily. This medication reduced the number of upsets to a few times weekly.  A further increase to 80 mg daily and of Thorazine to 75 mg bid had no effect on the upsets that occur with transitions, which include property destruction.  Upsets improved with increasing dexmethylphenidate to 20-20-10 mg daily using the IR formulation.     He attends a day program through [facility] 5 days weekly with a 1/2 day schedule. He has one on one supervision. He left previous program due to physical aggression towards staff and inappropriate actions such as pulling down his pants or running out of the building that have led to a 30 notice of discharge. He goes to sports activities with his home aide.     Other behaviors include punching out neighbor's windows, attacking strangers and parents, punching father's truck, agitation in mother's car, throw items, yell. He asks that doors and cabinets be closed, appliances must be put away. He is bothered by the music in stores but not from the radio in the car or at home, crowds. He goes on trips but usually does not enter the locations.  This is  reduced, perhaps due to increasing duloxetine.     Neptali was admitted to [facility] psychiatric unit in 2020 due to aggression. Methylphenidate, propranolol, and Abilify were stopped and chlorpromazine and venlafaxine were added for aggression and anxiety. He retuned to his home. Appetite is increased.     Past medications include methylphenidate, propranolol, Abilify, clonidine, risperidone, quetiapine, fluoxetine (worse).      ROS: Neptali developed nocturnal enuresis in February 2024 with a normal abdominal CT scan.  This reduced in frequency with a staff change.  It increased in conjunction with daytime enuresis and encopresis in June 2024 at his residential placement.  This rarely occurs at his day program or parent's home.  Reviewing a log finds it happens less often on weekends and usually happens in the evening and overnight.    Objective   Neurological Exam  Mental Status  Awake and alert.  Stands near me and says he wants to go  No aggression.    Motor   No abnormal involuntary movements.    Physical Exam  Constitutional:       General: He is awake.   Pulmonary:      Effort: Pulmonary effort is normal.   Abdominal:      Palpations: Abdomen is soft.   Neurological:      Mental Status: He is alert.       Assessment/Plan     Neptali's upsets are reduced.  He has enuresis and encopresis that is location and, to some degree, time specific and not associated with any change in his neurologic exam, leading to the conclusion that it is behavioral in nature.  He goes to bed at 8 PM but does nto fall asleep till after 10 PM.    No change in medications.  Develop a plan to encourage him to use the bathroom at a fixed schedule so as to minimize any accidents.  Shift bedtime to 10 PM, which is more appropriate for someone his age.  If not enough, might change ziprasidone to a 40-20-40 mg schedule.  Follow up in 3 months.

## 2024-08-30 DIAGNOSIS — F41.9 ANXIETY DISORDER, UNSPECIFIED TYPE: ICD-10-CM

## 2024-08-30 RX ORDER — HYDROXYZINE HYDROCHLORIDE 50 MG/1
TABLET, FILM COATED ORAL
Qty: 62 TABLET | Refills: 5 | Status: SHIPPED | OUTPATIENT
Start: 2024-08-30

## 2024-11-15 DIAGNOSIS — F90.9 ATTENTION DEFICIT HYPERACTIVITY DISORDER (ADHD), UNSPECIFIED ADHD TYPE: ICD-10-CM

## 2024-11-15 RX ORDER — DEXMETHYLPHENIDATE HYDROCHLORIDE 10 MG/1
TABLET ORAL
Qty: 140 TABLET | Refills: 0 | Status: SHIPPED | OUTPATIENT
Start: 2024-11-15

## 2024-12-05 ENCOUNTER — APPOINTMENT (OUTPATIENT)
Dept: PEDIATRIC NEUROLOGY | Facility: CLINIC | Age: 34
End: 2024-12-05
Payer: COMMERCIAL

## 2024-12-17 DIAGNOSIS — R46.89 AGGRESSIVE BEHAVIOR: ICD-10-CM

## 2024-12-17 DIAGNOSIS — F90.9 ATTENTION DEFICIT HYPERACTIVITY DISORDER (ADHD), UNSPECIFIED ADHD TYPE: ICD-10-CM

## 2024-12-17 RX ORDER — CHLORPROMAZINE HYDROCHLORIDE 25 MG/1
25 TABLET, FILM COATED ORAL 2 TIMES DAILY
Qty: 56 TABLET | Refills: 5 | Status: SHIPPED | OUTPATIENT
Start: 2024-12-30 | End: 2025-06-16

## 2024-12-17 RX ORDER — DEXMETHYLPHENIDATE HYDROCHLORIDE 10 MG/1
TABLET ORAL
Qty: 140 TABLET | Refills: 0 | Status: SHIPPED | OUTPATIENT
Start: 2025-02-24

## 2024-12-17 RX ORDER — DEXMETHYLPHENIDATE HYDROCHLORIDE 10 MG/1
TABLET ORAL
Qty: 140 TABLET | Refills: 0 | Status: SHIPPED | OUTPATIENT
Start: 2024-12-30

## 2024-12-17 RX ORDER — DEXMETHYLPHENIDATE HYDROCHLORIDE 10 MG/1
TABLET ORAL
Qty: 140 TABLET | Refills: 0 | Status: SHIPPED | OUTPATIENT
Start: 2025-01-27

## 2024-12-17 RX ORDER — CHLORPROMAZINE HYDROCHLORIDE 50 MG/1
50 TABLET, FILM COATED ORAL 2 TIMES DAILY
Qty: 56 TABLET | Refills: 5 | Status: SHIPPED | OUTPATIENT
Start: 2024-12-30 | End: 2025-06-16

## 2024-12-26 ENCOUNTER — TELEPHONE (OUTPATIENT)
Dept: PEDIATRIC NEUROLOGY | Facility: CLINIC | Age: 34
End: 2024-12-26
Payer: COMMERCIAL

## 2024-12-26 NOTE — TELEPHONE ENCOUNTER
Called and spoke with mom. She was unaware there was an issue at the day program and will call to get more information and then call this RN back. There is no HIPAA release on file for this RN to speak to the Day Program about Neptali's care.

## 2024-12-26 NOTE — TELEPHONE ENCOUNTER
----- Message from Amelie GUTIERREZ sent at 12/26/2024 12:37 PM EST -----  Regarding: DRUG CONFLICT  Deena from Neptali's  day program called and said there is a conflict with his medication and is asking for a  call back right away at [phone]    Thank you

## 2025-01-15 DIAGNOSIS — F90.9 ATTENTION DEFICIT HYPERACTIVITY DISORDER (ADHD), UNSPECIFIED ADHD TYPE: ICD-10-CM

## 2025-01-16 RX ORDER — TOPIRAMATE 50 MG/1
50 TABLET, FILM COATED ORAL 2 TIMES DAILY
Qty: 56 TABLET | Refills: 3 | Status: SHIPPED | OUTPATIENT
Start: 2025-01-16

## 2025-01-16 RX ORDER — TOPIRAMATE 25 MG/1
25 TABLET ORAL 2 TIMES DAILY
Qty: 56 TABLET | Refills: 3 | Status: SHIPPED | OUTPATIENT
Start: 2025-01-16

## 2025-01-16 RX ORDER — FENOFIBRATE 145 MG/1
145 TABLET, FILM COATED ORAL DAILY
Qty: 28 TABLET | Refills: 3 | Status: SHIPPED | OUTPATIENT
Start: 2025-01-16

## 2025-01-29 ENCOUNTER — APPOINTMENT (OUTPATIENT)
Dept: PEDIATRIC NEUROLOGY | Facility: CLINIC | Age: 35
End: 2025-01-29
Payer: COMMERCIAL

## 2025-01-29 DIAGNOSIS — R46.89 AGGRESSIVE BEHAVIOR: ICD-10-CM

## 2025-01-29 DIAGNOSIS — F41.9 ANXIETY DISORDER, UNSPECIFIED TYPE: Primary | ICD-10-CM

## 2025-01-29 DIAGNOSIS — G40.909 NONINTRACTABLE EPILEPSY WITHOUT STATUS EPILEPTICUS, UNSPECIFIED EPILEPSY TYPE (MULTI): ICD-10-CM

## 2025-01-29 PROCEDURE — 99213 OFFICE O/P EST LOW 20 MIN: CPT | Performed by: PSYCHIATRY & NEUROLOGY

## 2025-01-29 NOTE — PROGRESS NOTES
Subjective   Neptali Oviedo is a 34 y.o.  man with IDD and epilepsy  HPI    Neptali is a 33-year-old young man with developmental problems, epilepsy and challenging behaviors. He takes topiramate 75 mg twice a day and Depakote sprinkles 3 tid with no seizures or medication side effects.     Neptali takes dexmethylphenidate 20-20-10 mg (less active), duloxetine 80 mg qd, hydroxyzine 50 mg qAM and q4 PM ziprasidone 40-40-20 mg, and Thorazine 75 mg bid (tolerated dose decrease from 650 mg daily with some worsening on this dose). Methylphenidate and Abilify were stopped. A fluoxetine trial resulted in worse behavior so was stopped. Abilify reduced aggression. Increasing venlafaxine led to a call from his residence that he was more aggressive so the dose was decreased to 75 mg daily.. Venlafaxine was replaced with duloxetine, the present dose being 80 mg daily. This medication reduced the number of upsets to a few times weekly.  A further increase to 80 mg daily and of Thorazine to 75 mg bid had no effect on the upsets that occur with transitions, which include property destruction.  Upsets improved with increasing dexmethylphenidate to 20-20-10 mg daily using the IR formulation.     He attends a day program through [facility] 5 days weekly with a 1/2 day schedule. He has one on one supervision. He left previous program due to physical aggression towards staff and inappropriate actions such as pulling down his pants or running out of the building.   He pulls down pants and urinates when not getting his way at his present program.  He goes to sports activities with his home aide.     Other behaviors associated with frustration include noisy environments, punching out neighbor's windows, attacking strangers and parents, punching father's truck, agitation in mother's car, throw items, yell. He asks that doors and cabinets be closed, appliances must be put away. He is bothered by the music in stores but not from the radio in the  car or at home, crowds. He goes on trips but usually does not enter the locations.  This is reduced, perhaps due to increasing duloxetine.     Neptali was admitted to [facility] psychiatric unit in 2020 due to aggression. Methylphenidate, propranolol, and Abilify were stopped and chlorpromazine and venlafaxine were added for aggression and anxiety. He retuned to his home. Appetite is increased.     Past medications include methylphenidate, propranolol, Abilify, clonidine, risperidone, quetiapine, fluoxetine (worse).      ROS: Neptali developed nocturnal enuresis in February 2024 with a normal abdominal CT scan.  This reduced in frequency with a staff change.  It increased in conjunction with daytime enuresis and encopresis in June 2024 at his residential placement.  This rarely occurs at his day program or parent's home.  He has dental hygiene challenges, has lost teeth and complains that his mouth hurts.    Objective   Neurological Exam  Mental Status  Awake and alert.  Sits for mot of visit.  Asks about food.  No aggression.  Needs prompts to sit and wait.  Short utterances.    Motor   No abnormal involuntary movements.    Physical Exam  Constitutional:       General: He is awake.   Pulmonary:      Effort: Pulmonary effort is normal.   Neurological:      Mental Status: He is alert.       Assessment/Plan   t  Neptali has no seizures.  Behavior is better but he gets stressed by unexpected/unwanted change, not getting his way and noisy environments (as examples).  He has problems falling asleep at his parents' home during the last 2 visits (was OK in the past).      No change in present medications.  Consider adding hydroxyzine 50 mg at bedtime if not falling asleep as expected.  Watch for OCD behaviors and any relation to upsets  Follow up in 4 months

## 2025-01-29 NOTE — PATIENT INSTRUCTIONS
Neptali has no seizures.  Behavior is better but he gets stressed by unexpected/unwanted change, not getting his way and noisy environments (as examples).  He has problems falling asleep at his parents' home during the last 2 visits (was OK in the past).      No change in present medications.  Consider adding hydroxyzine 50 mg at bedtime if not falling asleep as expected.  Watch for OCD behaviors and any relation to upsets  Follow up in 4 months

## 2025-01-29 NOTE — LETTER
February 2, 2025     Mona Ingram DO  [address] [facility] [address]    Patient: Neptali Oviedo   YOB: 1990   Date of Visit: 1/29/2025       Dear Dr. Mona Ingram DO:    Thank you for referring Neptali Oviedo to me for evaluation. Below are my notes for this consultation.  If you have questions, please do not hesitate to call me. I look forward to following your patient along with you.       Sincerely,     Steve Gratn MD      CC: No Recipients  ______________________________________________________________________________________    Subjective  Neptali Oviedo is a 34 y.o.  man with IDD and epilepsy  HPI    Neptali is a 33-year-old young man with developmental problems, epilepsy and challenging behaviors. He takes topiramate 75 mg twice a day and Depakote sprinkles 3 tid with no seizures or medication side effects.     Neptali takes dexmethylphenidate 20-20-10 mg (less active), duloxetine 80 mg qd, hydroxyzine 50 mg qAM and q4 PM ziprasidone 40-40-20 mg, and Thorazine 75 mg bid (tolerated dose decrease from 650 mg daily with some worsening on this dose). Methylphenidate and Abilify were stopped. A fluoxetine trial resulted in worse behavior so was stopped. Abilify reduced aggression. Increasing venlafaxine led to a call from his residence that he was more aggressive so the dose was decreased to 75 mg daily.. Venlafaxine was replaced with duloxetine, the present dose being 80 mg daily. This medication reduced the number of upsets to a few times weekly.  A further increase to 80 mg daily and of Thorazine to 75 mg bid had no effect on the upsets that occur with transitions, which include property destruction.  Upsets improved with increasing dexmethylphenidate to 20-20-10 mg daily using the IR formulation.     He attends a day program through [facility] 5 days weekly with a 1/2 day schedule. He has one on one supervision. He left previous program due to physical aggression towards staff and  inappropriate actions such as pulling down his pants or running out of the building.   He pulls down pants and urinates when not getting his way at his present program.  He goes to sports activities with his home aide.     Other behaviors associated with frustration include noisy environments, punching out neighbor's windows, attacking strangers and parents, punching father's truck, agitation in mother's car, throw items, yell. He asks that doors and cabinets be closed, appliances must be put away. He is bothered by the music in stores but not from the radio in the car or at home, crowds. He goes on trips but usually does not enter the locations.  This is reduced, perhaps due to increasing duloxetine.     Neptali was admitted to [facility] psychiatric unit in 2020 due to aggression. Methylphenidate, propranolol, and Abilify were stopped and chlorpromazine and venlafaxine were added for aggression and anxiety. He retuned to his home. Appetite is increased.     Past medications include methylphenidate, propranolol, Abilify, clonidine, risperidone, quetiapine, fluoxetine (worse).      ROS: Neptali developed nocturnal enuresis in February 2024 with a normal abdominal CT scan.  This reduced in frequency with a staff change.  It increased in conjunction with daytime enuresis and encopresis in June 2024 at his residential placement.  This rarely occurs at his day program or parent's home.  He has dental hygiene challenges, has lost teeth and complains that his mouth hurts.    Objective  Neurological Exam  Mental Status  Awake and alert.  Sits for mot of visit.  Asks about food.  No aggression.  Needs prompts to sit and wait.  Short utterances.    Motor   No abnormal involuntary movements.    Physical Exam  Constitutional:       General: He is awake.   Pulmonary:      Effort: Pulmonary effort is normal.   Neurological:      Mental Status: He is alert.       Assessment/Plan  natalie Gunderson has no seizures.  Behavior is better but he  gets stressed by unexpected/unwanted change, not getting his way and noisy environments (as examples).  He has problems falling asleep at his parents' home during the last 2 visits (was OK in the past).      No change in present medications.  Consider adding hydroxyzine 50 mg at bedtime if not falling asleep as expected.  Watch for OCD behaviors and any relation to upsets  Follow up in 4 months

## 2025-02-12 DIAGNOSIS — F41.9 ANXIETY DISORDER, UNSPECIFIED TYPE: ICD-10-CM

## 2025-02-12 RX ORDER — DULOXETIN HYDROCHLORIDE 60 MG/1
CAPSULE, DELAYED RELEASE ORAL
Qty: 31 CAPSULE | Refills: 10 | Status: SHIPPED | OUTPATIENT
Start: 2025-02-12

## 2025-02-12 RX ORDER — DULOXETIN HYDROCHLORIDE 20 MG/1
CAPSULE, DELAYED RELEASE ORAL
Qty: 31 CAPSULE | Refills: 10 | Status: SHIPPED | OUTPATIENT
Start: 2025-02-12 | End: 2025-03-14

## 2025-02-28 ENCOUNTER — TELEPHONE (OUTPATIENT)
Dept: PEDIATRIC NEUROLOGY | Facility: CLINIC | Age: 35
End: 2025-02-28
Payer: COMMERCIAL

## 2025-02-28 DIAGNOSIS — F41.9 ANXIETY DISORDER, UNSPECIFIED TYPE: ICD-10-CM

## 2025-02-28 NOTE — TELEPHONE ENCOUNTER
Called and spoke with mom. Neptali does not do well waiting for appointments. He gets really worked up. In the past, Dr Grant had prescribed lorazepam 1mg tablets for him with instructions to try up to 2 tablets and this was not enough. He may have even been slightly more irritated with it. Mom looking for another medication to help him calm down and take the edge off before appointments, particularly the upcoming dental surgery.

## 2025-02-28 NOTE — TELEPHONE ENCOUNTER
Called and spoke with mom. Reviewed plan with her for diazepam 10mg tablets. She can try 1 tablet to start and if not enough then try 2 tablets. We will send 10 tablets so she can trial this prior to the dental procedure. Mom would like this script sent to [facility] in [place]. Mom verbalized understanding of the plan and states no further questions at this time.

## 2025-02-28 NOTE — TELEPHONE ENCOUNTER
90kg  Dexmethylphenidate 20mg/20mg/10mg  Duloxetine 80mg once daily  Hydroxyzine 50mg BID  Ziprasidone 40mg/40mg/20mg  Thorazine 75mg twice daily  Depakote 375mg three times daily  Fenofibrate 145mg once daily   Lorazepam 1mg as needed

## 2025-02-28 NOTE — TELEPHONE ENCOUNTER
Mom Neptali chapman is scheduled for dental surgery on 3/7. They have had difficulties in the past getting him in to sedate him. Mom wants something prescribed to help with the transition to sedation a little smoother.     [phone]

## 2025-03-01 RX ORDER — DIAZEPAM 10 MG/1
10 TABLET ORAL AS NEEDED
Qty: 10 TABLET | Refills: 0 | Status: SHIPPED | OUTPATIENT
Start: 2025-03-01 | End: 2025-03-31

## 2025-03-10 DIAGNOSIS — F90.9 ATTENTION DEFICIT HYPERACTIVITY DISORDER (ADHD), UNSPECIFIED ADHD TYPE: ICD-10-CM

## 2025-03-10 DIAGNOSIS — F41.9 ANXIETY DISORDER, UNSPECIFIED TYPE: ICD-10-CM

## 2025-03-11 RX ORDER — DEXMETHYLPHENIDATE HYDROCHLORIDE 10 MG/1
TABLET ORAL
Qty: 150 TABLET | Refills: 0 | Status: SHIPPED | OUTPATIENT
Start: 2025-03-11

## 2025-03-11 RX ORDER — DEXMETHYLPHENIDATE HYDROCHLORIDE 10 MG/1
TABLET ORAL
Qty: 150 TABLET | Refills: 0 | Status: SHIPPED | OUTPATIENT
Start: 2025-04-10

## 2025-03-11 RX ORDER — DEXMETHYLPHENIDATE HYDROCHLORIDE 10 MG/1
TABLET ORAL
Qty: 150 TABLET | Refills: 0 | Status: SHIPPED | OUTPATIENT
Start: 2025-05-10

## 2025-03-11 RX ORDER — HYDROXYZINE HYDROCHLORIDE 50 MG/1
TABLET, FILM COATED ORAL
Qty: 60 TABLET | Refills: 3 | Status: SHIPPED | OUTPATIENT
Start: 2025-03-11

## 2025-05-01 ENCOUNTER — APPOINTMENT (OUTPATIENT)
Dept: PEDIATRIC NEUROLOGY | Facility: CLINIC | Age: 35
End: 2025-05-01
Payer: COMMERCIAL

## 2025-05-02 ENCOUNTER — APPOINTMENT (OUTPATIENT)
Dept: PEDIATRIC NEUROLOGY | Facility: CLINIC | Age: 35
End: 2025-05-02
Payer: COMMERCIAL

## 2025-05-02 VITALS — WEIGHT: 201.5 LBS | BODY MASS INDEX: 28.75 KG/M2

## 2025-05-02 DIAGNOSIS — G40.909 NONINTRACTABLE EPILEPSY WITHOUT STATUS EPILEPTICUS, UNSPECIFIED EPILEPSY TYPE (MULTI): ICD-10-CM

## 2025-05-02 DIAGNOSIS — F41.9 ANXIETY DISORDER, UNSPECIFIED TYPE: ICD-10-CM

## 2025-05-02 DIAGNOSIS — R46.89 AGGRESSIVE BEHAVIOR: Primary | ICD-10-CM

## 2025-05-02 DIAGNOSIS — F90.9 ATTENTION DEFICIT HYPERACTIVITY DISORDER (ADHD), UNSPECIFIED ADHD TYPE: ICD-10-CM

## 2025-05-02 PROCEDURE — 99213 OFFICE O/P EST LOW 20 MIN: CPT | Performed by: PSYCHIATRY & NEUROLOGY

## 2025-05-02 NOTE — LETTER
May 2, 2025     Mona Ingram DO  [address] [facility] [address]    Patient: Neptali Oviedo   YOB: 1990   Date of Visit: 5/2/2025       Dear Dr. Mona Ingram DO:    Thank you for referring Neptali Oviedo to me for evaluation. Below are my notes for this consultation.  If you have questions, please do not hesitate to call me. I look forward to following your patient along with you.       Sincerely,     Steve Grant MD      CC: No Recipients  ______________________________________________________________________________________    Subjective  Neptali Oviedo is a 35 y.o.  man with IDD and anxiety.  HPI  Neptali is a 35-year-old young man with developmental problems, epilepsy and challenging behaviors. He takes topiramate 75 mg twice a day and Depakote sprinkles 3 tid with no seizures or medication side effects.     Neptali takes dexmethylphenidate 20-20-10 mg (less active), duloxetine 80 mg qd, hydroxyzine 50 mg qAM and q4 PM ziprasidone 40-40-20 mg, and Thorazine 75 mg bid (tolerated dose decrease from 650 mg daily with some worsening on this dose). Methylphenidate and Abilify were stopped. A fluoxetine trial resulted in worse behavior so was stopped. Abilify reduced aggression. Increasing venlafaxine led to a call from his residence that he was more aggressive so the dose was decreased to 75 mg daily.. Venlafaxine was replaced with duloxetine, the present dose being 80 mg daily. This medication reduced the number of upsets to a few times weekly.  A further increase to 80 mg daily and of Thorazine to 75 mg bid had no effect on the upsets that occur with transitions, which include property destruction.  Upsets improved with increasing dexmethylphenidate to 20-20-10 mg daily using the IR formulation.     He attends a day program through [facility] 5 days weekly with a 1/2 day schedule. He has one on one supervision. He left previous program due to physical aggression towards staff and  inappropriate actions such as pulling down his pants or running out of the building.   He pulls down pants and urinates when not getting his way at his present program.  He goes to sports activities with his home aide.     Other behaviors associated with frustration include noisy environments, punching out neighbor's windows, attacking strangers and parents, punching father's truck, agitation in mother's car, throw items, yell. He asks that doors and cabinets be closed, appliances must be put away. He is bothered by the music in stores but not from the radio in the car or at home, crowds. He goes on trips but usually does not enter the locations.  This is reduced, perhaps due to increasing duloxetine.  He wants his way (a learned habit)     Neptali was admitted to [facility] psychiatric unit in 2020 due to aggression. Methylphenidate, propranolol, and Abilify were stopped and chlorpromazine and venlafaxine were added for aggression and anxiety. He retuned to his home. Appetite is increased.     Past medications include methylphenidate, propranolol, Abilify, clonidine, risperidone, quetiapine, fluoxetine (worse).      ROS: Neptali developed nocturnal enuresis in February 2024 with a normal abdominal CT scan.  This reduced in frequency with a staff change.  It increased in conjunction with daytime enuresis and encopresis in June 2024 at his residential placement.  This rarely occurs at his day program or parent's home.  He has dental hygiene challenges, has lost teeth and complains that his mouth hurts.  Objective  Neurological Exam  Mental Status  Awake and alert.  Good mood  Wants to leave minutes after arrival.  When redirected, looks through a magazine  Walks down the singleton to get AVS with good listening.    Motor   No abnormal involuntary movements.    Gait  Casual gait is normal including stance, stride, and arm swing.    Physical Exam  Constitutional:       General: He is awake.   Pulmonary:      Effort: Pulmonary  effort is normal.   Abdominal:      Palpations: Abdomen is soft.   Neurological:      Mental Status: He is alert.       Assessment/Plan    Bradley' ADHD, anxiety and OCD are reasonably controlled.  He has actions when he wants his way that are likely learned in nature.    No change in medications.  Work with behaviors: Gauri Mayo ([phone]) the behavior specialist for adults with disabilities  Discussed dentist (at [email or web address] webpage).  Call before the visit to identify a medication that will help with compliance.  Call when medication renewal is needed  Follow up in 4 months

## 2025-05-02 NOTE — PROGRESS NOTES
Subjective   Neptali Oviedo is a 35 y.o.  man with IDD and anxiety.  HPI  Neptali is a 35-year-old young man with developmental problems, epilepsy and challenging behaviors. He takes topiramate 75 mg twice a day and Depakote sprinkles 3 tid with no seizures or medication side effects.     Neptali takes dexmethylphenidate 20-20-10 mg (less active), duloxetine 80 mg qd, hydroxyzine 50 mg qAM and q4 PM ziprasidone 40-40-20 mg, and Thorazine 75 mg bid (tolerated dose decrease from 650 mg daily with some worsening on this dose). Methylphenidate and Abilify were stopped. A fluoxetine trial resulted in worse behavior so was stopped. Abilify reduced aggression. Increasing venlafaxine led to a call from his residence that he was more aggressive so the dose was decreased to 75 mg daily.. Venlafaxine was replaced with duloxetine, the present dose being 80 mg daily. This medication reduced the number of upsets to a few times weekly.  A further increase to 80 mg daily and of Thorazine to 75 mg bid had no effect on the upsets that occur with transitions, which include property destruction.  Upsets improved with increasing dexmethylphenidate to 20-20-10 mg daily using the IR formulation.     He attends a day program through [facility] 5 days weekly with a 1/2 day schedule. He has one on one supervision. He left previous program due to physical aggression towards staff and inappropriate actions such as pulling down his pants or running out of the building.   He pulls down pants and urinates when not getting his way at his present program.  He goes to sports activities with his home aide.     Other behaviors associated with frustration include noisy environments, punching out neighbor's windows, attacking strangers and parents, punching father's truck, agitation in mother's car, throw items, yell. He asks that doors and cabinets be closed, appliances must be put away. He is bothered by the music in stores but not from the radio in the car  or at home, crowds. He goes on trips but usually does not enter the locations.  This is reduced, perhaps due to increasing duloxetine.  He wants his way (a learned habit)     Neptali was admitted to [facility] psychiatric unit in 2020 due to aggression. Methylphenidate, propranolol, and Abilify were stopped and chlorpromazine and venlafaxine were added for aggression and anxiety. He retuned to his home. Appetite is increased.     Past medications include methylphenidate, propranolol, Abilify, clonidine, risperidone, quetiapine, fluoxetine (worse).      ROS: Neptali developed nocturnal enuresis in February 2024 with a normal abdominal CT scan.  This reduced in frequency with a staff change.  It increased in conjunction with daytime enuresis and encopresis in June 2024 at his residential placement.  This rarely occurs at his day program or parent's home.  He has dental hygiene challenges, has lost teeth and complains that his mouth hurts.  Objective   Neurological Exam  Mental Status  Awake and alert.  Good mood  Wants to leave minutes after arrival.  When redirected, looks through a magazine  Walks down the singleton to get AVS with good listening.    Motor   No abnormal involuntary movements.    Gait  Casual gait is normal including stance, stride, and arm swing.    Physical Exam  Constitutional:       General: He is awake.   Pulmonary:      Effort: Pulmonary effort is normal.   Abdominal:      Palpations: Abdomen is soft.   Neurological:      Mental Status: He is alert.       Assessment/Plan     Bradley' ADHD, anxiety and OCD are reasonably controlled.  He has actions when he wants his way that are likely learned in nature.    No change in medications.  Work with behaviors: Gauri Mayo ([phone]) the behavior specialist for adults with disabilities  Discussed dentist (at [email or web address] webpage).  Call before the visit to identify a medication that will help with compliance.  Call when medication renewal is  needed  Follow up in 4 months   Quality 474: Zoster Vaccination Status: Shingrix vaccine was not administered for reasons documented by clinician (e.g. patient administered vaccine other than Shingrix, patient allergy or other medical reasons, patient declined or other patient reasons, vaccine not available or other system reasons) Additional Notes: Shingles SHINGRIX vaccine not received due to it being on back order. Detail Level: Detailed

## 2025-05-02 NOTE — PATIENT INSTRUCTIONS
Bradley' ADHD, anxiety and OCD are reasonably controlled.  He has actions when he wants his way that are likely learned in nature.    No change in medications.  Work with behaviors: Gauri Mayo ([phone]) the behavior specialist for adults with disabilities  Discussed dentist (at [email or web address] webpage).  Call before the visit to identify a medication that will help with compliance.  Call when medication renewal is needed  Follow up in 4 months

## 2025-05-19 DIAGNOSIS — G40.909 NONINTRACTABLE EPILEPSY WITHOUT STATUS EPILEPTICUS, UNSPECIFIED EPILEPSY TYPE (MULTI): ICD-10-CM

## 2025-05-19 DIAGNOSIS — F90.9 ATTENTION DEFICIT HYPERACTIVITY DISORDER (ADHD), UNSPECIFIED ADHD TYPE: ICD-10-CM

## 2025-05-19 DIAGNOSIS — R46.89 AGGRESSIVE BEHAVIOR: ICD-10-CM

## 2025-05-19 RX ORDER — DIVALPROEX SODIUM 125 MG/1
CAPSULE, COATED PELLETS ORAL
Qty: 252 CAPSULE | Refills: 5 | Status: SHIPPED | OUTPATIENT
Start: 2025-05-19

## 2025-05-19 RX ORDER — FENOFIBRATE 145 MG/1
145 TABLET, FILM COATED ORAL DAILY
Qty: 28 TABLET | Refills: 5 | Status: CANCELLED | OUTPATIENT
Start: 2025-05-19

## 2025-05-20 ENCOUNTER — TELEPHONE (OUTPATIENT)
Dept: PEDIATRIC NEUROLOGY | Facility: HOSPITAL | Age: 35
End: 2025-05-20
Payer: COMMERCIAL

## 2025-05-20 RX ORDER — TOPIRAMATE 50 MG/1
50 TABLET, FILM COATED ORAL 2 TIMES DAILY
Qty: 56 TABLET | Refills: 5 | Status: SHIPPED | OUTPATIENT
Start: 2025-05-20

## 2025-05-20 RX ORDER — ZIPRASIDONE HYDROCHLORIDE 20 MG/1
CAPSULE ORAL
Qty: 28 CAPSULE | Refills: 5 | Status: SHIPPED | OUTPATIENT
Start: 2025-05-20

## 2025-05-20 RX ORDER — TOPIRAMATE 25 MG/1
25 TABLET, FILM COATED ORAL 2 TIMES DAILY
Qty: 56 TABLET | Refills: 5 | Status: SHIPPED | OUTPATIENT
Start: 2025-05-20

## 2025-05-20 RX ORDER — ZIPRASIDONE HYDROCHLORIDE 40 MG/1
CAPSULE ORAL
Qty: 62 CAPSULE | Refills: 5 | Status: SHIPPED | OUTPATIENT
Start: 2025-05-20

## 2025-05-20 NOTE — TELEPHONE ENCOUNTER
Refills pended for Dr Grant's signature yesterday. Reviewed with Dr Grant. He does not do refills for fenofibrate. He sent them previously as a favor but this medication refill needs to come from the PCP. RN removed fenofibrate from refills pended and re-sent to Dr Grant to authorize.

## 2025-05-20 NOTE — TELEPHONE ENCOUNTER
Telephone Encounter    Name:  Neptali Oviedo  :  [phone]    Please call Sandi at [facility] in [place]  Per Sandi the only script they have available is the divalproex sprinkle (Depakote Sprinkle) 125 mg DR capsule  Please call regarding other refills

## 2025-05-20 NOTE — TELEPHONE ENCOUNTER
Called and spoke with mom. Let her know all refills were sent except the fenofibrate which needs to come from the PCP as this is a cholesterol medication. Mom states she already spoke with the pharmacy and they confirmed receiving all of these. Let mom know meds were sent for 6 months so she knows when to call again for further refills. Mom verbalized understanding and states no further questions at this time.

## 2025-06-04 DIAGNOSIS — R46.89 AGGRESSIVE BEHAVIOR: ICD-10-CM

## 2025-06-04 DIAGNOSIS — F90.9 ATTENTION DEFICIT HYPERACTIVITY DISORDER (ADHD), UNSPECIFIED ADHD TYPE: ICD-10-CM

## 2025-06-04 RX ORDER — DEXMETHYLPHENIDATE HYDROCHLORIDE 10 MG/1
TABLET ORAL
Qty: 150 TABLET | Refills: 0 | Status: SHIPPED | OUTPATIENT
Start: 2025-07-02

## 2025-06-04 RX ORDER — CHLORPROMAZINE HYDROCHLORIDE 25 MG/1
25 TABLET, FILM COATED ORAL 2 TIMES DAILY
Qty: 56 TABLET | Refills: 5 | Status: SHIPPED | OUTPATIENT
Start: 2025-06-04 | End: 2025-11-19

## 2025-06-04 RX ORDER — CHLORPROMAZINE HYDROCHLORIDE 50 MG/1
TABLET, FILM COATED ORAL
Qty: 56 TABLET | Refills: 5 | OUTPATIENT
Start: 2025-06-04

## 2025-06-04 RX ORDER — CHLORPROMAZINE HYDROCHLORIDE 50 MG/1
50 TABLET, FILM COATED ORAL 2 TIMES DAILY
Qty: 56 TABLET | Refills: 5 | Status: SHIPPED | OUTPATIENT
Start: 2025-06-04 | End: 2025-11-19

## 2025-06-04 RX ORDER — DEXMETHYLPHENIDATE HYDROCHLORIDE 10 MG/1
TABLET ORAL
Qty: 150 TABLET | Refills: 0 | Status: SHIPPED | OUTPATIENT
Start: 2025-07-30

## 2025-06-04 RX ORDER — CHLORPROMAZINE HYDROCHLORIDE 25 MG/1
TABLET, FILM COATED ORAL
Qty: 56 TABLET | Refills: 5 | OUTPATIENT
Start: 2025-06-04

## 2025-06-04 RX ORDER — DEXMETHYLPHENIDATE HYDROCHLORIDE 10 MG/1
TABLET ORAL
Qty: 150 TABLET | Refills: 0 | Status: SHIPPED | OUTPATIENT
Start: 2025-06-04

## 2025-07-01 DIAGNOSIS — F41.9 ANXIETY DISORDER, UNSPECIFIED TYPE: ICD-10-CM

## 2025-07-01 RX ORDER — HYDROXYZINE HYDROCHLORIDE 50 MG/1
TABLET, FILM COATED ORAL
Qty: 56 TABLET | Refills: 3 | Status: SHIPPED | OUTPATIENT
Start: 2025-07-01

## 2025-08-15 DIAGNOSIS — F90.9 ATTENTION DEFICIT HYPERACTIVITY DISORDER (ADHD), UNSPECIFIED ADHD TYPE: ICD-10-CM

## 2025-08-15 RX ORDER — DEXMETHYLPHENIDATE HYDROCHLORIDE 10 MG/1
TABLET ORAL
Qty: 150 TABLET | Refills: 0 | Status: SHIPPED | OUTPATIENT
Start: 2025-10-22

## 2025-08-15 RX ORDER — DEXMETHYLPHENIDATE HYDROCHLORIDE 10 MG/1
TABLET ORAL
Qty: 150 TABLET | Refills: 0 | Status: SHIPPED | OUTPATIENT
Start: 2025-09-24

## 2025-08-15 RX ORDER — DEXMETHYLPHENIDATE HYDROCHLORIDE 10 MG/1
TABLET ORAL
Qty: 150 TABLET | Refills: 0 | Status: SHIPPED | OUTPATIENT
Start: 2025-08-27

## 2025-09-09 ENCOUNTER — APPOINTMENT (OUTPATIENT)
Dept: PEDIATRIC NEUROLOGY | Facility: CLINIC | Age: 35
End: 2025-09-09
Payer: COMMERCIAL